# Patient Record
Sex: FEMALE | Race: WHITE | NOT HISPANIC OR LATINO | Employment: UNEMPLOYED | URBAN - METROPOLITAN AREA
[De-identification: names, ages, dates, MRNs, and addresses within clinical notes are randomized per-mention and may not be internally consistent; named-entity substitution may affect disease eponyms.]

---
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
## 2019-01-22 ENCOUNTER — OFFICE VISIT (OUTPATIENT)
Dept: FAMILY MEDICINE | Facility: CLINIC | Age: 19
End: 2019-01-22
Payer: COMMERCIAL

## 2019-01-22 VITALS
OXYGEN SATURATION: 98 % | SYSTOLIC BLOOD PRESSURE: 94 MMHG | TEMPERATURE: 98.2 F | HEIGHT: 70 IN | DIASTOLIC BLOOD PRESSURE: 58 MMHG | WEIGHT: 176.8 LBS | BODY MASS INDEX: 25.31 KG/M2 | HEART RATE: 81 BPM

## 2019-01-22 DIAGNOSIS — R09.81 NASAL CONGESTION: ICD-10-CM

## 2019-01-22 DIAGNOSIS — M67.431 GANGLION CYST OF WRIST, RIGHT: ICD-10-CM

## 2019-01-22 DIAGNOSIS — J45.30 MILD PERSISTENT ASTHMA WITHOUT COMPLICATION: Primary | ICD-10-CM

## 2019-01-22 DIAGNOSIS — F98.8 ATTENTION DEFICIT DISORDER (ADD) WITHOUT HYPERACTIVITY: ICD-10-CM

## 2019-01-22 PROCEDURE — 99214 OFFICE O/P EST MOD 30 MIN: CPT | Performed by: FAMILY MEDICINE

## 2019-01-22 RX ORDER — FLUTICASONE PROPIONATE 50 MCG
1 SPRAY, SUSPENSION (ML) NASAL DAILY
Qty: 1 BOTTLE | Refills: 1 | Status: SHIPPED | OUTPATIENT
Start: 2019-01-22 | End: 2020-01-22

## 2019-01-22 RX ORDER — ALBUTEROL SULFATE 90 UG/1
2 AEROSOL, METERED RESPIRATORY (INHALATION) EVERY 4 HOURS PRN
Qty: 1 INHALER | Refills: 3 | Status: SHIPPED | OUTPATIENT
Start: 2019-01-22 | End: 2019-08-26

## 2019-01-22 ASSESSMENT — MIFFLIN-ST. JEOR: SCORE: 1661.18

## 2019-01-22 NOTE — LETTER
My Asthma Action Plan  Name: Lisa Jacques   YOB: 2000  Date: 1/22/2019   My doctor: Nickie Wylie MD   My clinic: [facility]        My Control Medicine: None  My Rescue Medicine: Albuterol (Proair/Ventolin/Proventil) inhaler 2 puffs every 4-6 hours as needed before exercise   My Asthma Severity: intermittent  Avoid your asthma triggers: exercise or sports  exercise or sports            GREEN ZONE   Good Control    I feel good    No cough or wheeze    Can work, sleep and play without asthma symptoms       Take your asthma control medicine every day.     1. If exercise triggers your asthma, take your rescue medication    15 minutes before exercise or sports, and    During exercise if you have asthma symptoms  2. Spacer to use with inhaler: If you have a spacer, make sure to use it with your inhaler             YELLOW ZONE Getting Worse  I have ANY of these:    I do not feel good    Cough or wheeze    Chest feels tight    Wake up at night   1. Keep taking your Green Zone medications  2. Start taking your rescue medicine:    every 20 minutes for up to 1 hour. Then every 4 hours for 24-48 hours.  3. If you stay in the Yellow Zone for more than 12-24 hours, contact your doctor.  4. If you do not return to the Green Zone in 12-24 hours or you get worse, start taking your oral steroid medicine if prescribed by your provider.           RED ZONE Medical Alert - Get Help  I have ANY of these:    I feel awful    Medicine is not helping    Breathing getting harder    Trouble walking or talking    Nose opens wide to breathe       1. Take your rescue medicine NOW  2. If your provider has prescribed an oral steroid medicine, start taking it NOW  3. Call your doctor NOW  4. If you are still in the Red Zone after 20 minutes and you have not reached your doctor:    Take your rescue medicine again and    Call 911 or go to the emergency room right away    See your regular doctor within 2  weeks of an Emergency Room or Urgent Care visit for follow-up treatment.          Annual Reminders:  Meet with Asthma Educator,  Flu Shot in the Fall, consider Pneumonia Vaccination for patients with asthma (aged 19 and older).    Pharmacy:    [id number] - [place], [place] - [address] [place] [address] PHARMACY [place], [place] - [address]  [facility] - - [place] - [address] [facility], [place] - [address] [id number] - [place], [place] - [address] AT [address] OF [address] [place] [id number] [address]                      Asthma Triggers  How To Control Things That Make Your Asthma Worse    Triggers are things that make your asthma worse.  Look at the list below to help you find your triggers and what you can do about them.  You can help prevent asthma flare-ups by staying away from your triggers.      Trigger                                                          What you can do   Cigarette Smoke  Tobacco smoke can make asthma worse. Do not allow smoking in your home, car or around you.  Be sure no one smokes at a child s day care or school.  If you smoke, ask your health care provider for ways to help you quit.  Ask family members to quit too.  Ask your health care provider for a referral to Quit Plan to help you quit smoking, or call 4-995-599-PLAN.     Colds, Flu, Bronchitis  These are common triggers of asthma. Wash your hands often.  Don t touch your eyes, nose or mouth.  Get a flu shot every year.     Dust Mites  These are tiny bugs that live in cloth or carpet. They are too small to see. Wash sheets and blankets in hot water every week.   Encase pillows and mattress in dust mite proof covers.  Avoid having carpet if you can. If you have carpet, vacuum weekly.   Use a dust mask and HEPA vacuum.   Pollen and Outdoor Mold  Some people are  allergic to trees, grass, or weed pollen, or molds. Try to keep your windows closed.  Limit time out doors when pollen count is high.   Ask you health care provider about taking medicine during allergy season.     Animal Dander  Some people are allergic to skin flakes, urine or saliva from pets with fur or feathers. Keep pets with fur or feathers out of your home.    If you can t keep the pet outdoors, then keep the pet out of your bedroom.  Keep the bedroom door closed.  Keep pets off cloth furniture and away from stuffed toys.     Mice, Rats, and Cockroaches  Some people are allergic to the waste from these pests.   Cover food and garbage.  Clean up spills and food crumbs.  Store grease in the refrigerator.   Keep food out of the bedroom.   Indoor Mold  This can be a trigger if your home has high moisture. Fix leaking faucets, pipes, or other sources of water.   Clean moldy surfaces.  Dehumidify basement if it is damp and smelly.   Smoke, Strong Odors, and Sprays  These can reduce air quality. Stay away from strong odors and sprays, such as perfume, powder, hair spray, paints, smoke incense, paint, cleaning products, candles and new carpet.   Exercise or Sports  Some people with asthma have this trigger. Be active!  Ask your doctor about taking medicine before sports or exercise to prevent symptoms.    Warm up for 5-10 minutes before and after sports or exercise.     Other Triggers of Asthma  Cold air:  Cover your nose and mouth with a scarf.  Sometimes laughing or crying can be a trigger.  Some medicines and food can trigger asthma.

## 2019-01-22 NOTE — PROGRESS NOTES
SUBJECTIVE:   Lisa Jacques is a 18 year old female who presents to clinic today for the following health issues:      Asthma Follow-Up    Was ACT completed today?    Yes    ACT Total Scores 1/22/2019   ACT TOTAL SCORE -   ASTHMA ER VISITS -   ASTHMA HOSPITALIZATIONS -   ACT TOTAL SCORE (Goal Greater than or Equal to 20) 20   In the past 12 months, how many times did you visit the emergency room for your asthma without being admitted to the hospital? 0   In the past 12 months, how many times were you hospitalized overnight because of your asthma? 0       Recent asthma triggers that patient is dealing with: exercise or sports        Amount of exercise or physical activity: 2-3 days/week for an average of 45-60 minutes    Problems taking medications regularly: No    Medication side effects: none    Diet: regular (no restrictions)      Medication Followup of ADD    Taking Medication as prescribed: NO ran out so stopped taking it and she has been fine with no meds     Not having really any attention issues at all    School going well     Side Effects:  None    Medication Helping Symptoms:  not applicable       Musculoskeletal problem/pain      Duration: several months off and on     Description  Location: right wrist lump that is tender     Intensity:  mild    Accompanying signs and symptoms: none    History  Previous similar problem: no   Previous evaluation:  none    Precipitating or alleviating factors:  Trauma or overuse: no   Aggravating factors include: overuse and pressure on it     Therapies tried and outcome: nothing      Problem list and histories reviewed & adjusted, as indicated.  Additional history: as documented    Labs reviewed in EPIC    Reviewed and updated as needed this visit by clinical staff  Tobacco  Allergies  Meds  Problems  Med Hx  Surg Hx  Fam Hx       Reviewed and updated as needed this visit by Provider  Tobacco  Allergies  Meds  Problems  Med Hx  Surg Hx  Fam Hx      "    ROS:  Constitutional, HEENT, cardiovascular, pulmonary, gi and gu systems are negative, except as otherwise noted.    OBJECTIVE:                                                    BP 94/58 (BP Location: Right arm, Patient Position: Chair, Cuff Size: Adult Regular)   Pulse 81   Temp 98.2  F (36.8  C) (Tympanic)   Ht 1.784 m (5' 10.25\")   Wt 80.2 kg (176 lb 12.8 oz)   SpO2 98%   BMI 25.19 kg/m     Body mass index is 25.19 kg/m .  GENERAL APPEARANCE: healthy, alert and no distress  EYES: Eyes grossly normal to inspection, PERRL and conjunctivae and sclerae normal  HENT: ear canals and TM's normal and nose erythema and edema and mouth without ulcers or lesions  NECK: no adenopathy, no asymmetry, masses, or scars and thyroid normal to palpation  RESP: lungs clear to auscultation - no rales, rhonchi or wheezes  CV: regular rates and rhythm, normal S1 S2, no S3 or S4 and no murmur, click or rub  ORTHO: Wrist Exam: WRIST:  Inspection: mass/prominence noted: location: right ulnar flexure side   Palpation: Tender: Mass palpated: location: right ulnar flex.  Characteristics: firm and mobile   Non-tender:   Range of Motion: normal  Strength: no deficits  Special tests:     ELBOW:  elbow exam not done    PSYCH: mentation appears normal and affect normal/bright         ASSESSMENT/PLAN:                                                    1. Mild persistent asthma without complication  Stable no change in treatment plan.   - albuterol (VENTOLIN HFA) 108 (90 Base) MCG/ACT inhaler; Inhale 2 puffs into the lungs every 4 hours as needed for wheezing FOR COUGH, 2 PUFFS 15 MINUTES BEFORE EXERCISE  Dispense: 1 Inhaler; Refill: 3    2. Nasal congestion  Likely rhinitis related to cat exposure   - fluticasone (FLONASE) 50 MCG/ACT nasal spray; Spray 1 spray into both nostrils daily  Dispense: 1 Bottle; Refill: 1    3. Ganglion cyst of wrist, right    - ORTHO  REFERRAL    4. Attention deficit disorder (ADD) without " hyperactivity  Off meds in remission       Patient Instructions   Albuterol is refilled     Try flonase for nasal symptoms, may wait until you are not exposed to cat to see if this resolves on its own    Cyst on the wrist see francis Wylie MD  [facility]

## 2019-01-22 NOTE — NURSING NOTE
"Chief Complaint   Patient presents with     Asthma     Attention Deficit Disorder       Initial BP 94/58 (BP Location: Right arm, Patient Position: Chair, Cuff Size: Adult Regular)   Pulse 81   Temp 98.2  F (36.8  C) (Tympanic)   Ht 1.784 m (5' 10.25\")   Wt 80.2 kg (176 lb 12.8 oz)   SpO2 98%   BMI 25.19 kg/m   Estimated body mass index is 25.19 kg/m  as calculated from the following:    Height as of this encounter: 1.784 m (5' 10.25\").    Weight as of this encounter: 80.2 kg (176 lb 12.8 oz).    Patient presents to the clinic using No DME    Health Maintenance that is potentially due pending provider review:  NONE    n/a    Is there anyone who you would like to be able to receive your results? No  If yes have patient fill out MIKEY    "

## 2019-01-23 ASSESSMENT — ASTHMA QUESTIONNAIRES: ACT_TOTALSCORE: 20

## 2019-08-26 ENCOUNTER — OFFICE VISIT (OUTPATIENT)
Dept: FAMILY MEDICINE | Facility: CLINIC | Age: 19
End: 2019-08-26
Payer: COMMERCIAL

## 2019-08-26 VITALS
OXYGEN SATURATION: 99 % | SYSTOLIC BLOOD PRESSURE: 102 MMHG | HEART RATE: 70 BPM | RESPIRATION RATE: 15 BRPM | BODY MASS INDEX: 26.88 KG/M2 | TEMPERATURE: 98.1 F | DIASTOLIC BLOOD PRESSURE: 70 MMHG | WEIGHT: 187.8 LBS | HEIGHT: 70 IN

## 2019-08-26 DIAGNOSIS — J45.30 MILD PERSISTENT ASTHMA WITHOUT COMPLICATION: ICD-10-CM

## 2019-08-26 DIAGNOSIS — M25.531 RIGHT WRIST PAIN: Primary | ICD-10-CM

## 2019-08-26 PROCEDURE — 99214 OFFICE O/P EST MOD 30 MIN: CPT | Performed by: FAMILY MEDICINE

## 2019-08-26 RX ORDER — ALBUTEROL SULFATE 90 UG/1
2 AEROSOL, METERED RESPIRATORY (INHALATION) EVERY 4 HOURS PRN
Qty: 1 INHALER | Refills: 3 | Status: SHIPPED | OUTPATIENT
Start: 2019-08-26 | End: 2020-12-18

## 2019-08-26 ASSESSMENT — MIFFLIN-ST. JEOR: SCORE: 1706.08

## 2019-08-26 NOTE — PROGRESS NOTES
"Subjective     Lisa Jacques is a 19 year old female who presents to clinic today for the following health issues:    HPI   Cyst      Duration: several years, worse lately    Description (location/character/radiation): right wrist    Intensity:  mild    Accompanying signs and symptoms: none    History (similar episodes/previous evaluation): Seen in January for this    Precipitating or alleviating factors: pressure makes it worse, writing    Therapies tried and outcome: None    She has pain with any use of her wrist now   also with typing and writing or any pressure on the wrist she has pain        Asthma Follow-Up    Was ACT completed today?  24  Yes    ACT Total Scores 1/22/2019   ACT TOTAL SCORE -   ASTHMA ER VISITS -   ASTHMA HOSPITALIZATIONS -   ACT TOTAL SCORE (Goal Greater than or Equal to 20) 20   In the past 12 months, how many times did you visit the emergency room for your asthma without being admitted to the hospital? 0   In the past 12 months, how many times were you hospitalized overnight because of your asthma? 0       How many days per week do you miss taking your asthma controller medication?  0    Please describe any recent triggers for your asthma: None    Have you had any Emergency Room Visits, Urgent Care Visits, or Hospital Admissions since your last office visit?  No              Reviewed and updated as needed this visit by Provider  Tobacco  Allergies  Meds  Problems  Med Hx  Surg Hx  Fam Hx         Review of Systems   ROS COMP: Constitutional, HEENT, cardiovascular, pulmonary, gi and gu systems are negative, except as otherwise noted.      Objective    /70 (BP Location: Right arm, Patient Position: Chair, Cuff Size: Adult Large)   Pulse 70   Temp 98.1  F (36.7  C) (Tympanic)   Resp 15   Ht 1.784 m (5' 10.25\")   Wt 85.2 kg (187 lb 12.8 oz)   SpO2 99%   BMI 26.76 kg/m    Body mass index is 26.76 kg/m .  Physical Exam   GENERAL APPEARANCE: healthy, alert and no " "distress  RESP: lungs clear to auscultation - no rales, rhonchi or wheezes  CV: regular rates and rhythm, normal S1 S2, no S3 or S4 and no murmur, click or rub  ORTHO: Wrist Exam: WRIST:  Inspection: mass/prominence noted: location: right wrist ulnar side ganglion*.    Palpation: Tender: Tenderness:, over mass  Non-tender:   Range of Motion: normal  Strength: no deficits  Special tests: negative Tinel's at carpal tunnel.  , negative Phalen's.      ELBOW:  elbow exam not done    PSYCH: mentation appears normal and affect normal/bright    Diagnostic Test Results:  Labs reviewed in Epic        Assessment & Plan     1. Mild persistent asthma without complication  Stable no change in treatment plan.   - albuterol (VENTOLIN HFA) 108 (90 Base) MCG/ACT inhaler; Inhale 2 puffs into the lungs every 4 hours as needed for wheezing FOR COUGH, 2 PUFFS 15 MINUTES BEFORE EXERCISE  Dispense: 1 Inhaler; Refill: 3    2. Right wrist pain  Due to ganglion   - ORTHO  REFERRAL  - order for DME; Equipment being ordered: wrist splint  Dispense: 1 Device; Refill: 0     BMI:   Estimated body mass index is 26.76 kg/m  as calculated from the following:    Height as of this encounter: 1.784 m (5' 10.25\").    Weight as of this encounter: 85.2 kg (187 lb 12.8 oz).           Patient Instructions   Wrist splint for comfort    Set up Ortho appt for your wrist you want to request the surgeon not sports med    Albuterol is refilled       Return in about 1 month (around 9/26/2019), or if symptoms worsen or fail to improve.      Risks, benefits, side effects and rationale for treatment plan fully discussed with the patient and understanding expressed.     Nickie Wylie MD  [facility]      "

## 2019-08-26 NOTE — PATIENT INSTRUCTIONS
Wrist splint for comfort    Set up Ortho appt for your wrist you want to request the surgeon not sports med    Albuterol is refilled

## 2019-08-26 NOTE — NURSING NOTE
"Chief Complaint   Patient presents with     Cyst       Initial /70 (BP Location: Right arm, Patient Position: Chair, Cuff Size: Adult Large)   Pulse 70   Temp 98.1  F (36.7  C) (Tympanic)   Resp 15   Ht 1.784 m (5' 10.25\")   Wt 85.2 kg (187 lb 12.8 oz)   SpO2 99%   BMI 26.76 kg/m   Estimated body mass index is 26.76 kg/m  as calculated from the following:    Height as of this encounter: 1.784 m (5' 10.25\").    Weight as of this encounter: 85.2 kg (187 lb 12.8 oz).    Patient presents to the clinic using No DME    Health Maintenance that is potentially due pending provider review:  ACT    Possibly completing today per provider review.    Is there anyone who you would like to be able to receive your results? No  If yes have patient fill out MIKEY    "

## 2019-08-27 ASSESSMENT — ASTHMA QUESTIONNAIRES: ACT_TOTALSCORE: 24

## 2020-06-18 ENCOUNTER — MYC MEDICAL ADVICE (OUTPATIENT)
Dept: FAMILY MEDICINE | Facility: CLINIC | Age: 20
End: 2020-06-18

## 2020-11-16 ENCOUNTER — HEALTH MAINTENANCE LETTER (OUTPATIENT)
Age: 20
End: 2020-11-16

## 2020-12-18 ENCOUNTER — OFFICE VISIT (OUTPATIENT)
Dept: FAMILY MEDICINE | Facility: CLINIC | Age: 20
End: 2020-12-18
Payer: COMMERCIAL

## 2020-12-18 VITALS
SYSTOLIC BLOOD PRESSURE: 104 MMHG | BODY MASS INDEX: 26.05 KG/M2 | TEMPERATURE: 98.2 F | DIASTOLIC BLOOD PRESSURE: 74 MMHG | HEIGHT: 70 IN | HEART RATE: 76 BPM | WEIGHT: 182 LBS | RESPIRATION RATE: 12 BRPM

## 2020-12-18 DIAGNOSIS — J45.30 MILD PERSISTENT ASTHMA WITHOUT COMPLICATION: ICD-10-CM

## 2020-12-18 DIAGNOSIS — N92.0 MENORRHAGIA WITH REGULAR CYCLE: ICD-10-CM

## 2020-12-18 DIAGNOSIS — Z00.01 ENCOUNTER FOR WELL ADULT EXAM WITH ABNORMAL FINDINGS: Primary | ICD-10-CM

## 2020-12-18 DIAGNOSIS — Z12.4 SCREENING FOR MALIGNANT NEOPLASM OF CERVIX: ICD-10-CM

## 2020-12-18 LAB — TSH SERPL DL<=0.005 MIU/L-ACNC: 3.82 MU/L (ref 0.4–4)

## 2020-12-18 PROCEDURE — 99213 OFFICE O/P EST LOW 20 MIN: CPT | Mod: 25 | Performed by: FAMILY MEDICINE

## 2020-12-18 PROCEDURE — 36415 COLL VENOUS BLD VENIPUNCTURE: CPT | Performed by: FAMILY MEDICINE

## 2020-12-18 PROCEDURE — 99395 PREV VISIT EST AGE 18-39: CPT | Performed by: FAMILY MEDICINE

## 2020-12-18 PROCEDURE — 86580 TB INTRADERMAL TEST: CPT | Performed by: FAMILY MEDICINE

## 2020-12-18 PROCEDURE — G0145 SCR C/V CYTO,THINLAYER,RESCR: HCPCS | Performed by: FAMILY MEDICINE

## 2020-12-18 PROCEDURE — 84443 ASSAY THYROID STIM HORMONE: CPT | Performed by: FAMILY MEDICINE

## 2020-12-18 RX ORDER — ALBUTEROL SULFATE 90 UG/1
2 AEROSOL, METERED RESPIRATORY (INHALATION) EVERY 4 HOURS PRN
Qty: 1 INHALER | Refills: 3 | Status: SHIPPED | OUTPATIENT
Start: 2020-12-18 | End: 2022-06-08

## 2020-12-18 RX ORDER — ETHYNODIOL DIACETATE AND ETHINYL ESTRADIOL 1 MG-35MCG
1 KIT ORAL DAILY
Qty: 90 TABLET | Refills: 3 | Status: SHIPPED | OUTPATIENT
Start: 2020-12-18 | End: 2021-11-24

## 2020-12-18 ASSESSMENT — ENCOUNTER SYMPTOMS
HEMATOCHEZIA: 0
HEADACHES: 0
FREQUENCY: 0
JOINT SWELLING: 0
MYALGIAS: 0
WEAKNESS: 0
DIZZINESS: 0
COUGH: 0
SORE THROAT: 0
HEARTBURN: 0
CHILLS: 0
DIARRHEA: 0
PARESTHESIAS: 0
BREAST MASS: 0
DYSURIA: 0
FEVER: 0
PALPITATIONS: 0
ARTHRALGIAS: 0
NAUSEA: 0
EYE PAIN: 0
SHORTNESS OF BREATH: 0
NERVOUS/ANXIOUS: 1
HEMATURIA: 0
CONSTIPATION: 0
ABDOMINAL PAIN: 0

## 2020-12-18 ASSESSMENT — PAIN SCALES - GENERAL: PAINLEVEL: NO PAIN (0)

## 2020-12-18 ASSESSMENT — MIFFLIN-ST. JEOR: SCORE: 1670.8

## 2020-12-18 NOTE — PROGRESS NOTES
SUBJECTIVE:   CC: Lisa Jacques is an 20 year old woman who presents for preventive health visit.       Patient has been advised of split billing requirements and indicates understanding: Yes  Healthy Habits:     Getting at least 3 servings of Calcium per day:  NO    Bi-annual eye exam:  Yes    Dental care twice a year:  Yes    Sleep apnea or symptoms of sleep apnea:  None    Diet:  Regular (no restrictions)    Frequency of exercise:  2-3 days/week    Duration of exercise:  45-60 minutes    Taking medications regularly:  Yes    PHQ-2 Total Score: 2    Additional concerns today:  Yes    Menometrorrhagia    Feels like her periods are not good on this pill   Has some bleeding intermentrual   Moodiness 3 of 4 weeks   Not sure if depressed feels tired but is stressed with school   She is not sure if there is something more to it  Not sure if ;modd issues hare PMS or true anxiety   Mom has thyroid issues so would like to rule that out     Today's PHQ-2 Score:   PHQ-2 ( 2000 Pf[employer]) 12/18/2020   Q1: Little interest or pleasure in doing things 1   Q2: Feeling down, depressed or hopeless 1   PHQ-2 Score 2   Q1: Little interest or pleasure in doing things Several days   Q2: Feeling down, depressed or hopeless Several days   PHQ-2 Score 2       Abuse: Current or Past (Physical, Sexual or Emotional) - No  Do you feel safe in your environment? Yes        Social History     Tobacco Use     Smoking status: Never Smoker     Smokeless tobacco: Never Used   Substance Use Topics     Alcohol use: No     If you drink alcohol do you typically have >3 drinks per day or >7 drinks per week? No    Alcohol Use 12/18/2020   Prescreen: >3 drinks/day or >7 drinks/week? No   Prescreen: >3 drinks/day or >7 drinks/week? -       Reviewed orders with patient.  Reviewed health maintenance and updated orders accordingly - Yes  Labs reviewed in EPIC    Mammogram not appropriate for this patient based on age.    Pertinent mammograms are reviewed  "under the imaging tab.  History of abnormal Pap smear: NO - age 21-29 PAP every 3 years recommended     Reviewed and updated as needed this visit by clinical staff  Tobacco  Allergies  Meds  Problems  Med Hx  Surg Hx  Fam Hx          Reviewed and updated as needed this visit by Provider  Tobacco  Allergies  Meds  Problems  Med Hx  Surg Hx  Fam Hx             Review of Systems   Constitutional: Negative for chills and fever.   HENT: Negative for congestion, ear pain, hearing loss and sore throat.    Eyes: Negative for pain and visual disturbance.   Respiratory: Negative for cough and shortness of breath.    Cardiovascular: Negative for chest pain, palpitations and peripheral edema.   Gastrointestinal: Negative for abdominal pain, constipation, diarrhea, heartburn, hematochezia and nausea.   Breasts:  Negative for tenderness, breast mass and discharge.   Genitourinary: Negative for dysuria, frequency, genital sores, hematuria, pelvic pain, urgency, vaginal bleeding and vaginal discharge.   Musculoskeletal: Negative for arthralgias, joint swelling and myalgias.   Skin: Negative for rash.   Neurological: Negative for dizziness, weakness, headaches and paresthesias.   Psychiatric/Behavioral: Negative for mood changes. The patient is nervous/anxious.      See hpi     OBJECTIVE:   /74   Pulse 76   Temp 98.2  F (36.8  C) (Tympanic)   Resp 12   Ht 1.778 m (5' 10\")   Wt 82.6 kg (182 lb)   LMP 11/30/2020   BMI 26.11 kg/m    Physical Exam  GENERAL: healthy, alert and no distress  EYES: Eyes grossly normal to inspection, PERRL and conjunctivae and sclerae normal  HENT: ear canals and TM's normal, nose and mouth without ulcers or lesions  NECK: no adenopathy, no asymmetry, masses, or scars and thyroid normal to palpation  RESP: lungs clear to auscultation - no rales, rhonchi or wheezes  BREAST: normal without masses, tenderness or nipple discharge and no palpable axillary masses or adenopathy  CV: " "regular rate and rhythm, normal S1 S2, no S3 or S4, no murmur, click or rub, no peripheral edema and peripheral pulses strong  ABDOMEN: soft, nontender, no hepatosplenomegaly, no masses and bowel sounds normal   (female): normal female external genitalia, normal urethral meatus, vaginal mucosa pink, moist, well rugated, and normal cervix/adnexa/uterus without masses or discharge  MS: no gross musculoskeletal defects noted, no edema  SKIN: no suspicious lesions or rashes  NEURO: Normal strength and tone, mentation intact and speech normal  PSYCH: mentation appears normal, affect normal/bright    Diagnostic Test Results:  Labs reviewed in Epic    ASSESSMENT/PLAN:   1. Encounter for well adult exam with abnormal findings      2. Screening for malignant neoplasm of cervix    - Pap imaged thin layer screen only - recommended age 21 - 24 years    3. Menorrhagia with regular cycle  With increase in moodiness and fatigue   Will try new OCP and see if this is better would consider med for anxiety or depression if sxs not better with change of OCP   Seems currenly cyclical   - ethynodiol-ethinyl estradiol (KELNOR) 1-35 MG-MCG tablet; Take 1 tablet by mouth daily  Dispense: 90 tablet; Refill: 3  - TSH with free T4 reflex    4. Asthma   Stable no change in treatment plan.     Patient has been advised of split billing requirements and indicates understanding: Yes  COUNSELING:  Reviewed preventive health counseling, as reflected in patient instructions    Estimated body mass index is 26.11 kg/m  as calculated from the following:    Height as of this encounter: 1.778 m (5' 10\").    Weight as of this encounter: 82.6 kg (182 lb).        She reports that she has never smoked. She has never used smokeless tobacco.      Counseling Resources:  ATP IV Guidelines  Pooled Cohorts Equation Calculator  Breast Cancer Risk Calculator  BRCA-Related Cancer Risk Assessment: FHS-7 Tool  FRAX Risk Assessment  ICSI Preventive Guidelines  Dietary " Guidelines for Americans, 2010  USDA's MyPlate  ASA Prophylaxis  Lung CA Screening    Nickie Wylie MD  [facility]

## 2020-12-18 NOTE — NURSING NOTE
"Chief Complaint   Patient presents with     Physical     with pap     /74   Pulse 76   Temp 98.2  F (36.8  C) (Tympanic)   Resp 12   Ht 1.778 m (5' 10\")   Wt 82.6 kg (182 lb)   LMP 11/30/2020   BMI 26.11 kg/m   Estimated body mass index is 26.11 kg/m  as calculated from the following:    Height as of this encounter: 1.778 m (5' 10\").    Weight as of this encounter: 82.6 kg (182 lb).  Patient presents to the clinic using No DME      Health Maintenance that is potentially due pending provider review:    Health Maintenance Due   Topic Date Due     Pneumococcal Vaccine: Pediatrics (0 to 5 Years) and At-Risk Patients (6 to 64 Years) (1 of 1 - PPSV23) 07/06/2005     HPV IMMUNIZATION (1 - 2-dose series) 07/06/2010     HIV SCREENING  07/06/2014     PREVENTIVE CARE VISIT  10/22/2014     HEPATITIS C SCREENING  07/06/2017     ASTHMA ACTION PLAN  01/22/2020     ASTHMA CONTROL TEST  02/26/2020     PAP  07/06/2020     INFLUENZA VACCINE (1) 09/01/2020        Will wait for flu shot.        "

## 2020-12-19 ASSESSMENT — ASTHMA QUESTIONNAIRES: ACT_TOTALSCORE: 23

## 2020-12-22 LAB
COPATH REPORT: NORMAL
PAP: NORMAL

## 2020-12-28 ENCOUNTER — ALLIED HEALTH/NURSE VISIT (OUTPATIENT)
Dept: FAMILY MEDICINE | Facility: CLINIC | Age: 20
End: 2020-12-28
Payer: COMMERCIAL

## 2020-12-28 DIAGNOSIS — Z23 NEED FOR TUBERCULOSIS VACCINATION: Primary | ICD-10-CM

## 2020-12-28 PROCEDURE — 86580 TB INTRADERMAL TEST: CPT

## 2020-12-30 ENCOUNTER — ALLIED HEALTH/NURSE VISIT (OUTPATIENT)
Dept: FAMILY MEDICINE | Facility: CLINIC | Age: 20
End: 2020-12-30
Payer: COMMERCIAL

## 2020-12-30 DIAGNOSIS — Z23 NEED FOR TUBERCULOSIS VACCINATION: Primary | ICD-10-CM

## 2020-12-30 PROCEDURE — 99207 PR NO CHARGE NURSE ONLY: CPT

## 2021-02-11 NOTE — PATIENT INSTRUCTIONS
;      [facility] Emergency Department  [address], [place] [address]  [phone]     Clinical Summary     PERSON INFORMATION   Name LEONELA PRETTY Age  20 Years  2000 12:00 AM   Acct# [id number] Sex Female Phone [phone]   Dispo Type Home - (i.e. Home on oxygen, DME)-  Arrival 2020 12:05 PM Checkout 2020 4:02 PM    Address: [address] [place] [id number]      Visit Reason R SIDED FACIAL  R ARM PAIN     ED Physician Note      ED Time Seen By Provider Entered On:  2020 12:17     Performed On:  2020 12:17  by Josefa Mackenzie               Time Seen By Provider   Time Seen by Provider :   2020 12:17    Josefa Mackenzie - 2020 12:17           VITALS INFORMATION  Vitals/Ht/Wt  Temperature Tympanic:  97 F  Peripheral Pulse Rate:  122 bpm  Respiratory Rate:  20 br/min  Oxygen Saturation:  98 %  Oxygen Therapy:  Room air  Systolic Blood Pressure:  131 mmHg  Diastolic Blood Pressure:  87 mmHg  Mean Arterial Pressure:  102 mmHg  Height:  165.1 cm  Weight:  77.35 kg  Weight Type:  Measured  Weight Method:  Bed scale  ED Hand-Off Communication  ED Hand-Off Reason:  Shift Report  ED Hand-Off Reason / In Hospital:  SBAR reviewed during report  Patient on Cardiac Monitor:  No  Sitter Present:  No  Changes in Condition/Tx/Progress Note:  Pt awaiting Udrug results.  Potential Core Measure:  N/A  Hand-off Report Given to:  Darlene Love   Allergy Info:          No Known Medication Allergies             Prescriptions:            DISCHARGE INFORMATION   Discharge Disposition: Home - (i.e. Home on oxygen, DME)-      PATIENT EDUCATION INFORMATION   Instructions:       Head Injury, Adult   Follow up:                  With: Address: When:   [address]  [id number] [phone] Business (1) Within 1 to 2 days   Comments:   Take ibuprofen or tylenol for  Change birth control and se if this helps your mood     TB test today     We will also check your thyroid    Update me in one month with how the new pill is working and how your mood is   pain.  Take medications as prescribed.  Minimize exposure to bright lights or screens for at least 1 week.   No contact sports for two weeks.  Follow up with primary care in 1-2 days for close monitoring  Ice the affected area for 15 minutes at a time taking at least 30 minute breaks in between.  Return to ED if any new or worsening symptoms including lightheadedness or passing out, vision changes, vomiting, trouble with speech, trouble walking, or memory loss.            DIAGNOSIS

## 2021-05-21 ENCOUNTER — OFFICE VISIT (OUTPATIENT)
Dept: FAMILY MEDICINE | Facility: CLINIC | Age: 21
End: 2021-05-21
Payer: COMMERCIAL

## 2021-05-21 VITALS
BODY MASS INDEX: 26.34 KG/M2 | WEIGHT: 184 LBS | DIASTOLIC BLOOD PRESSURE: 74 MMHG | SYSTOLIC BLOOD PRESSURE: 110 MMHG | RESPIRATION RATE: 12 BRPM | HEART RATE: 84 BPM | HEIGHT: 70 IN

## 2021-05-21 DIAGNOSIS — H02.826 MILIA OF EYELIDS OF BOTH EYES: Primary | ICD-10-CM

## 2021-05-21 DIAGNOSIS — H02.823 MILIA OF EYELIDS OF BOTH EYES: Primary | ICD-10-CM

## 2021-05-21 DIAGNOSIS — N89.8 VAGINAL LESION: ICD-10-CM

## 2021-05-21 PROCEDURE — 99213 OFFICE O/P EST LOW 20 MIN: CPT | Performed by: FAMILY MEDICINE

## 2021-05-21 PROCEDURE — 87491 CHLMYD TRACH DNA AMP PROBE: CPT | Performed by: FAMILY MEDICINE

## 2021-05-21 SDOH — HEALTH STABILITY: MENTAL HEALTH: HOW OFTEN DO YOU HAVE A DRINK CONTAINING ALCOHOL?: MONTHLY OR LESS

## 2021-05-21 SDOH — HEALTH STABILITY: MENTAL HEALTH: HOW OFTEN DO YOU HAVE 6 OR MORE DRINKS ON ONE OCCASION?: NOT ASKED

## 2021-05-21 SDOH — HEALTH STABILITY: MENTAL HEALTH: HOW MANY STANDARD DRINKS CONTAINING ALCOHOL DO YOU HAVE ON A TYPICAL DAY?: NOT ASKED

## 2021-05-21 ASSESSMENT — MIFFLIN-ST. JEOR: SCORE: 1683.84

## 2021-05-21 NOTE — PROGRESS NOTES
"    Assessment & Plan     Milia of eyelids of both eyes  Reassurance   If removal wanted would sent to derm     Vaginal lesion  Reassurance normal vaginal tissue   - Chlamydia trachomatis PCR             BMI:   Estimated body mass index is 26.21 kg/m  as calculated from the following:    Height as of this encounter: 1.784 m (5' 10.25\").    Weight as of this encounter: 83.5 kg (184 lb).           Return in about 1 year (around 5/21/2022) for Routine Preventative Visit.    Nickie Wylie MD  [facility]    Ruben Cadena is a 20 year old who presents for the following health issues     HPI     Skin tags on eyes - would like checked on     Vaginal Symptoms  Onset/Duration: 1 week  Some mild cramping after period  Thinks she may have some bumps by her vagina          Review of Systems   Constitutional, HEENT, cardiovascular, pulmonary, gi and gu systems are negative, except as otherwise noted.      Objective    /74   Pulse 84   Resp 12   Ht 1.784 m (5' 10.25\")   Wt 83.5 kg (184 lb)   LMP 05/10/2021   BMI 26.21 kg/m    Body mass index is 26.21 kg/m .  Physical Exam   GENERAL APPEARANCE: healthy, alert and no distress  EYES: Eyes grossly normal to inspection and milia joan eye lids    (female): normal cervix, adnexae, and uterus without masses or discharge  PSYCH: mentation appears normal and affect normal/bright                  "

## 2021-05-21 NOTE — NURSING NOTE
"Chief Complaint   Patient presents with     Derm Problem     skin tags on eyes     Vaginal Problem     /74   Pulse 84   Resp 12   Ht 1.784 m (5' 10.25\")   Wt 83.5 kg (184 lb)   LMP 05/10/2021   BMI 26.21 kg/m   Estimated body mass index is 26.21 kg/m  as calculated from the following:    Height as of this encounter: 1.784 m (5' 10.25\").    Weight as of this encounter: 83.5 kg (184 lb).  Patient presents to the clinic using No [facility] DME      Health Maintenance that is potentially due pending provider review:    Health Maintenance Due   Topic Date Due     ANNUAL REVIEW OF HM ORDERS  Never done     ADVANCE CARE PLANNING  Never done     Pneumococcal Vaccine: Pediatrics (0 to 5 Years) and At-Risk Patients (6 to 64 Years) (1 of 2 - PPSV23) Never done     HPV IMMUNIZATION (1 - 2-dose series) Never done     COVID-19 Vaccine (1) Never done     HIV SCREENING  Never done     HEPATITIS C SCREENING  Never done     CHLAMYDIA SCREENING  06/05/2019     ASTHMA ACTION PLAN  01/22/2020     ASTHMA CONTROL TEST  06/18/2021        Pended.        "

## 2021-05-22 LAB
C TRACH DNA SPEC QL NAA+PROBE: NEGATIVE
SPECIMEN SOURCE: NORMAL

## 2021-05-22 ASSESSMENT — ASTHMA QUESTIONNAIRES: ACT_TOTALSCORE: 24

## 2021-09-18 ENCOUNTER — HEALTH MAINTENANCE LETTER (OUTPATIENT)
Age: 21
End: 2021-09-18

## 2021-11-04 ENCOUNTER — E-VISIT (OUTPATIENT)
Dept: FAMILY MEDICINE | Facility: CLINIC | Age: 21
End: 2021-11-04
Payer: COMMERCIAL

## 2021-11-04 DIAGNOSIS — H10.32 ACUTE BACTERIAL CONJUNCTIVITIS OF LEFT EYE: Primary | ICD-10-CM

## 2021-11-04 PROCEDURE — 99421 OL DIG E/M SVC 5-10 MIN: CPT | Performed by: FAMILY MEDICINE

## 2021-11-04 RX ORDER — POLYMYXIN B SULFATE AND TRIMETHOPRIM 1; 10000 MG/ML; [USP'U]/ML
1-2 SOLUTION OPHTHALMIC EVERY 4 HOURS
Qty: 10 ML | Refills: 0 | Status: SHIPPED | OUTPATIENT
Start: 2021-11-04 | End: 2021-11-11

## 2021-11-04 NOTE — PATIENT INSTRUCTIONS
Thank you for choosing us for your care. I have placed an order for a prescription so that you can start treatment. View your full visit summary for details by clicking on the link below. Your pharmacist will able to address any questions you may have about the medication.     If you re not feeling better within 2-3 days, please schedule an appointment.  You can schedule an appointment right here in [facility], or call [phone]  If the visit is for the same symptoms as your eVisit, we ll refund the cost of your eVisit if seen within seven days.      Bacterial Conjunctivitis    You have an infection in the membranes covering the white part of the eye. This part of the eye is called the conjunctiva. The infection is called conjunctivitis. The most common symptoms of conjunctivitis include a thick, pus-like discharge from the eye, swollen eyelids, redness, eyelids sticking together upon awakening, and a gritty or scratchy feeling in the eye. Your infection was caused by bacteria. It may be treated with medicine. With treatment, the infection takes about 7 to 10 days to resolve.   Home care    Use prescribed antibiotic eye drops or ointment as directed to treat the infection.    Apply a warm compress (towel soaked in warm water) to the affected eye 3 to 4 times a day. Do this just before applying medicine to the eye.    Use a warm, wet cloth to wipe away crusting of the eyelids in the morning. This is caused by mucus drainage during the night. You may also use saline irrigating solution or artificial tears to rinse away mucus in the eye. Do not put a patch over the eye.    Wash your hands before and after touching the infected eye. This is to prevent spreading the infection to the other eye, and to other people. Don't share your towels or washcloths with others.    You may use acetaminophen or ibuprofen to control pain, unless another medicine was prescribed. Talk with your healthcare provider before using these  medicines if you have chronic liver or kidney disease. Also talk with your provider if you have ever had a stomach ulcer or digestive bleeding.    Don't wear contact lenses until your eyes have healed and all symptoms are gone.    Follow-up care  Follow up with your healthcare provider, or as advised.  When to seek medical advice  Call your healthcare provider right away if any of these occur:    Worsening vision    Increasing pain in the eye    Increasing swelling or redness of the eyelid    Redness spreading around the eye  [employer] last reviewed this educational content on 4/1/2020 2000-2021 [employer]. All rights reserved. This information is not intended as a substitute for professional medical care. Always follow your healthcare professional's instructions.

## 2021-11-24 ENCOUNTER — MYC MEDICAL ADVICE (OUTPATIENT)
Dept: FAMILY MEDICINE | Facility: CLINIC | Age: 21
End: 2021-11-24
Payer: COMMERCIAL

## 2021-11-24 DIAGNOSIS — N92.0 MENORRHAGIA WITH REGULAR CYCLE: ICD-10-CM

## 2021-11-24 RX ORDER — ETHYNODIOL DIACETATE AND ETHINYL ESTRADIOL 1 MG-35MCG
1 KIT ORAL DAILY
Qty: 84 TABLET | Refills: 1 | Status: SHIPPED | OUTPATIENT
Start: 2021-11-24 | End: 2024-02-21

## 2021-12-14 ENCOUNTER — E-VISIT (OUTPATIENT)
Dept: FAMILY MEDICINE | Facility: CLINIC | Age: 21
End: 2021-12-14
Payer: COMMERCIAL

## 2021-12-14 DIAGNOSIS — J06.9 UPPER RESPIRATORY TRACT INFECTION, UNSPECIFIED TYPE: Primary | ICD-10-CM

## 2021-12-14 PROCEDURE — 99422 OL DIG E/M SVC 11-20 MIN: CPT | Performed by: FAMILY MEDICINE

## 2021-12-14 RX ORDER — BENZONATATE 100 MG/1
100 CAPSULE ORAL 3 TIMES DAILY PRN
Qty: 30 CAPSULE | Refills: 0 | Status: SHIPPED | OUTPATIENT
Start: 2021-12-14 | End: 2024-02-21

## 2021-12-14 NOTE — PATIENT INSTRUCTIONS
"  Dear Lisa Jacques    After reviewing your responses, I've been able to diagnose you with \"Bronchitis\" which is a common infection of your lungs that is nearly always caused by a virus. The virus causes swelling and irritation of the air passages of your lungs which leads to cough. The illness spreads from your nose and throat to your windpipe and airways. It is often called a \"chest cold\" and can last up to 2 weeks, but is not a serious illness. Exposure to cigarette smoke usually makes this significantly worse.      To treat bronchitis, the main thing to do is drink lots of fluids and rest. Cough medications over-the-counter such as mucinex, robitussin or \"cold and sinus\" medications can be helpful. Ibuprofen and Tylenol also help with fevers or aching feelings that you often have with this kind of illness. Do not take ibuprofen if you have kidney disease, stomach ulcers or allergy to aspirin.     Bronchitis is most often highly contagious as viruses are spread through the air or touch. Avoid contact with others who may become infected, particularly children, the elderly and those whose immune systems might be weak.     If your symptoms worsen, you develop chest pain or shortness of breath, fevers over 101, or are not improving in 5 days, please contact your primary care provider for an appointment or visit any of our convenient Walk-in Care or Urgent Care Centers to be seen which can be found on our website here.    Thanks again for choosing us as your health care partner,    Nickie Wylie MD  "

## 2022-02-16 ENCOUNTER — MYC MEDICAL ADVICE (OUTPATIENT)
Dept: FAMILY MEDICINE | Facility: CLINIC | Age: 22
End: 2022-02-16
Payer: COMMERCIAL

## 2022-03-05 ENCOUNTER — HEALTH MAINTENANCE LETTER (OUTPATIENT)
Age: 22
End: 2022-03-05

## 2022-04-12 ENCOUNTER — MYC MEDICAL ADVICE (OUTPATIENT)
Dept: FAMILY MEDICINE | Facility: CLINIC | Age: 22
End: 2022-04-12
Payer: COMMERCIAL

## 2022-06-08 ENCOUNTER — MYC MEDICAL ADVICE (OUTPATIENT)
Dept: FAMILY MEDICINE | Facility: CLINIC | Age: 22
End: 2022-06-08
Payer: COMMERCIAL

## 2022-06-08 DIAGNOSIS — J45.30 MILD PERSISTENT ASTHMA WITHOUT COMPLICATION: ICD-10-CM

## 2022-06-08 RX ORDER — ALBUTEROL SULFATE 90 UG/1
2 AEROSOL, METERED RESPIRATORY (INHALATION) EVERY 4 HOURS PRN
Qty: 18 G | Refills: 0 | Status: SHIPPED | OUTPATIENT
Start: 2022-06-08 | End: 2024-02-21

## 2022-11-19 ENCOUNTER — HEALTH MAINTENANCE LETTER (OUTPATIENT)
Age: 22
End: 2022-11-19

## 2023-04-09 ENCOUNTER — HEALTH MAINTENANCE LETTER (OUTPATIENT)
Age: 23
End: 2023-04-09

## 2023-10-06 ENCOUNTER — E-VISIT (OUTPATIENT)
Dept: FAMILY MEDICINE | Facility: CLINIC | Age: 23
End: 2023-10-06
Payer: COMMERCIAL

## 2023-10-06 DIAGNOSIS — R21 RASH: Primary | ICD-10-CM

## 2023-10-06 DIAGNOSIS — L21.9 SEBORRHEIC DERMATITIS: ICD-10-CM

## 2023-10-06 PROCEDURE — 99421 OL DIG E/M SVC 5-10 MIN: CPT | Performed by: FAMILY MEDICINE

## 2023-10-09 RX ORDER — KETOCONAZOLE 20 MG/ML
SHAMPOO TOPICAL
Qty: 120 ML | Refills: 1 | Status: SHIPPED | OUTPATIENT
Start: 2023-10-09

## 2024-02-21 ENCOUNTER — OFFICE VISIT (OUTPATIENT)
Dept: FAMILY MEDICINE | Facility: CLINIC | Age: 24
End: 2024-02-21
Payer: COMMERCIAL

## 2024-02-21 VITALS
WEIGHT: 177 LBS | SYSTOLIC BLOOD PRESSURE: 108 MMHG | BODY MASS INDEX: 25.34 KG/M2 | HEART RATE: 56 BPM | TEMPERATURE: 97.6 F | HEIGHT: 70 IN | OXYGEN SATURATION: 98 % | RESPIRATION RATE: 12 BRPM | DIASTOLIC BLOOD PRESSURE: 70 MMHG

## 2024-02-21 DIAGNOSIS — Z11.3 SCREENING FOR STDS (SEXUALLY TRANSMITTED DISEASES): ICD-10-CM

## 2024-02-21 DIAGNOSIS — Z12.4 CERVICAL CANCER SCREENING: ICD-10-CM

## 2024-02-21 DIAGNOSIS — Z00.00 ROUTINE GENERAL MEDICAL EXAMINATION AT A HEALTH CARE FACILITY: Primary | ICD-10-CM

## 2024-02-21 DIAGNOSIS — Z13.220 SCREENING FOR HYPERLIPIDEMIA: ICD-10-CM

## 2024-02-21 DIAGNOSIS — Z11.4 SCREENING FOR HIV (HUMAN IMMUNODEFICIENCY VIRUS): ICD-10-CM

## 2024-02-21 DIAGNOSIS — B35.4 TINEA CORPORIS: ICD-10-CM

## 2024-02-21 DIAGNOSIS — Z11.59 NEED FOR HEPATITIS C SCREENING TEST: ICD-10-CM

## 2024-02-21 DIAGNOSIS — Z13.1 SCREENING FOR DIABETES MELLITUS: ICD-10-CM

## 2024-02-21 DIAGNOSIS — J45.30 MILD PERSISTENT ASTHMA WITHOUT COMPLICATION: ICD-10-CM

## 2024-02-21 PROBLEM — K35.80 APPENDICITIS, ACUTE: Status: ACTIVE | Noted: 2019-09-15

## 2024-02-21 LAB
C TRACH DNA SPEC QL NAA+PROBE: NEGATIVE
CHOLEST SERPL-MCNC: 96 MG/DL
FASTING STATUS PATIENT QL REPORTED: YES
FASTING STATUS PATIENT QL REPORTED: YES
GLUCOSE SERPL-MCNC: 96 MG/DL (ref 70–99)
HCV AB SERPL QL IA: NONREACTIVE
HDLC SERPL-MCNC: 46 MG/DL
HIV 1+2 AB+HIV1 P24 AG SERPL QL IA: NONREACTIVE
LDLC SERPL CALC-MCNC: 45 MG/DL
NONHDLC SERPL-MCNC: 50 MG/DL
TRIGL SERPL-MCNC: 27 MG/DL

## 2024-02-21 PROCEDURE — 82947 ASSAY GLUCOSE BLOOD QUANT: CPT | Performed by: FAMILY MEDICINE

## 2024-02-21 PROCEDURE — 87491 CHLMYD TRACH DNA AMP PROBE: CPT | Performed by: FAMILY MEDICINE

## 2024-02-21 PROCEDURE — 80061 LIPID PANEL: CPT | Performed by: FAMILY MEDICINE

## 2024-02-21 PROCEDURE — 99214 OFFICE O/P EST MOD 30 MIN: CPT | Mod: 25 | Performed by: FAMILY MEDICINE

## 2024-02-21 PROCEDURE — G0145 SCR C/V CYTO,THINLAYER,RESCR: HCPCS | Performed by: FAMILY MEDICINE

## 2024-02-21 PROCEDURE — 90651 9VHPV VACCINE 2/3 DOSE IM: CPT | Performed by: FAMILY MEDICINE

## 2024-02-21 PROCEDURE — 90472 IMMUNIZATION ADMIN EACH ADD: CPT | Performed by: FAMILY MEDICINE

## 2024-02-21 PROCEDURE — 87389 HIV-1 AG W/HIV-1&-2 AB AG IA: CPT | Performed by: FAMILY MEDICINE

## 2024-02-21 PROCEDURE — 90471 IMMUNIZATION ADMIN: CPT | Performed by: FAMILY MEDICINE

## 2024-02-21 PROCEDURE — 36415 COLL VENOUS BLD VENIPUNCTURE: CPT | Performed by: FAMILY MEDICINE

## 2024-02-21 PROCEDURE — 86803 HEPATITIS C AB TEST: CPT | Performed by: FAMILY MEDICINE

## 2024-02-21 PROCEDURE — 99395 PREV VISIT EST AGE 18-39: CPT | Mod: 25 | Performed by: FAMILY MEDICINE

## 2024-02-21 PROCEDURE — 90715 TDAP VACCINE 7 YRS/> IM: CPT | Performed by: FAMILY MEDICINE

## 2024-02-21 RX ORDER — ALBUTEROL SULFATE 90 UG/1
2 AEROSOL, METERED RESPIRATORY (INHALATION) EVERY 4 HOURS PRN
Qty: 18 G | Refills: 0 | Status: SHIPPED | OUTPATIENT
Start: 2024-02-21

## 2024-02-21 RX ORDER — PRENATAL VIT 91/IRON/FOLIC/DHA 28-975-200
COMBINATION PACKAGE (EA) ORAL 2 TIMES DAILY
Qty: 42 G | Refills: 1 | Status: SHIPPED | OUTPATIENT
Start: 2024-02-21

## 2024-02-21 SDOH — HEALTH STABILITY: PHYSICAL HEALTH: ON AVERAGE, HOW MANY DAYS PER WEEK DO YOU ENGAGE IN MODERATE TO STRENUOUS EXERCISE (LIKE A BRISK WALK)?: 3 DAYS

## 2024-02-21 ASSESSMENT — ASTHMA QUESTIONNAIRES
ACT_TOTALSCORE: 19
QUESTION_2 LAST FOUR WEEKS HOW OFTEN HAVE YOU HAD SHORTNESS OF BREATH: THREE TO SIX TIMES A WEEK
QUESTION_3 LAST FOUR WEEKS HOW OFTEN DID YOUR ASTHMA SYMPTOMS (WHEEZING, COUGHING, SHORTNESS OF BREATH, CHEST TIGHTNESS OR PAIN) WAKE YOU UP AT NIGHT OR EARLIER THAN USUAL IN THE MORNING: NOT AT ALL
QUESTION_1 LAST FOUR WEEKS HOW MUCH OF THE TIME DID YOUR ASTHMA KEEP YOU FROM GETTING AS MUCH DONE AT WORK, SCHOOL OR AT HOME: NONE OF THE TIME
ACT_TOTALSCORE: 21
QUESTION_4 LAST FOUR WEEKS HOW OFTEN HAVE YOU USED YOUR RESCUE INHALER OR NEBULIZER MEDICATION (SUCH AS ALBUTEROL): ONE OR TWO TIMES PER DAY
QUESTION_5 LAST FOUR WEEKS HOW WOULD YOU RATE YOUR ASTHMA CONTROL: WELL CONTROLLED

## 2024-02-21 ASSESSMENT — PAIN SCALES - GENERAL: PAINLEVEL: NO PAIN (0)

## 2024-02-21 ASSESSMENT — SOCIAL DETERMINANTS OF HEALTH (SDOH): HOW OFTEN DO YOU GET TOGETHER WITH FRIENDS OR RELATIVES?: TWICE A WEEK

## 2024-02-21 NOTE — PATIENT INSTRUCTIONS
Preventive Care Advice   This is general advice given by our system to help you stay healthy. However, your care team may have specific advice just for you. Please talk to your care team about your preventive care needs.  Nutrition  Eat 5 or more servings of fruits and vegetables each day.  Try wheat bread, brown rice and whole grain pasta (instead of white bread, rice, and pasta).  Get enough calcium and vitamin D. Check the label on foods and aim for 100% of the RDA (recommended daily allowance).  Lifestyle  Exercise at least 150 minutes each week  (30 minutes a day, 5 days a week).  Do muscle strengthening activities 2 days a week. These help control your weight and prevent disease.  No smoking.  Wear sunscreen to prevent skin cancer.  Have a dental exam and cleaning every 6 months.  Yearly exams  See your health care team every year to talk about:  Any changes in your health.  Any medicines your care team has prescribed.  Preventive care, family planning, and ways to prevent chronic diseases.  Shots (vaccines)   HPV shots (up to age 26), if you've never had them before.  Hepatitis B shots (up to age 59), if you've never had them before.  COVID-19 shot: Get this shot when it's due.  Flu shot: Get a flu shot every year.  Tetanus shot: Get a tetanus shot every 10 years.  Pneumococcal, hepatitis A, and RSV shots: Ask your care team if you need these based on your risk.  Shingles shot (for age 50 and up)  General health tests  Diabetes screening:  Starting at age 35, Get screened for diabetes at least every 3 years.  If you are younger than age 35, ask your care team if you should be screened for diabetes.  Cholesterol test: At age 39, start having a cholesterol test every 5 years, or more often if advised.  Bone density scan (DEXA): At age 50, ask your care team if you should have this scan for osteoporosis (brittle bones).  Hepatitis C: Get tested at least once in your life.  STIs (sexually transmitted  infections)  Before age 24: Ask your care team if you should be screened for STIs.  After age 24: Get screened for STIs if you're at risk. You are at risk for STIs (including HIV) if:  You are sexually active with more than one person.  You don't use condoms every time.  You or a partner was diagnosed with a sexually transmitted infection.  If you are at risk for HIV, ask about PrEP medicine to prevent HIV.  Get tested for HIV at least once in your life, whether you are at risk for HIV or not.  Cancer screening tests  Cervical cancer screening: If you have a cervix, begin getting regular cervical cancer screening tests starting at age 21.  Breast cancer scan (mammogram): If you've ever had breasts, begin having regular mammograms starting at age 40. This is a scan to check for breast cancer.  Colon cancer screening: It is important to start screening for colon cancer at age 45.  Have a colonoscopy test every 10 years (or more often if you're at risk) Or, ask your provider about stool tests like a FIT test every year or Cologuard test every 3 years.  To learn more about your testing options, visit:   https://www.INDOM/569978.pdf.  For help making a decision, visit:   https://bit.ly/wc45145.  Prostate cancer screening test: If you have a prostate, ask your care team if a prostate cancer screening test (PSA) at age 55 is right for you.  Lung cancer screening: If you are a current or former smoker ages 50 to 80, ask your care team if ongoing lung cancer screenings are right for you.  For informational purposes only. Not to replace the advice of your health care provider. Copyright   2023 [facility]. All rights reserved. Clinically reviewed by the [facility] Transitions Program. [employer] 948059 - REV 01/24.    Learning About Stress  What is stress?     Stress is your body's response to a hard situation. Your body can have a physical, emotional, or mental response. Stress is a fact of life for  most people, and it affects everyone differently. What causes stress for you may not be stressful for someone else.  A lot of things can cause stress. You may feel stress when you go on a job interview, take a test, or run a race. This kind of short-term stress is normal and even useful. It can help you if you need to work hard or react quickly. For example, stress can help you finish an important job on time.  Long-term stress is caused by ongoing stressful situations or events. Examples of long-term stress include long-term health problems, ongoing problems at work, or conflicts in your family. Long-term stress can harm your health.  How does stress affect your health?  When you are stressed, your body responds as though you are in danger. It makes hormones that speed up your heart, make you breathe faster, and give you a burst of energy. This is called the fight-or-flight stress response. If the stress is over quickly, your body goes back to normal and no harm is done.  But if stress happens too often or lasts too long, it can have bad effects. Long-term stress can make you more likely to get sick, and it can make symptoms of some diseases worse. If you tense up when you are stressed, you may develop neck, shoulder, or low back pain. Stress is linked to high blood pressure and heart disease.  Stress also harms your emotional health. It can make you jerez, tense, or depressed. Your relationships may suffer, and you may not do well at work or school.  What can you do to manage stress?  You can try these things to help manage stress:   Do something active. Exercise or activity can help reduce stress. Walking is a great way to get started. Even everyday activities such as housecleaning or yard work can help.  Try yoga or genevieve chi. These techniques combine exercise and meditation. You may need some training at first to learn them.  Do something you enjoy. For example, listen to music or go to a movie. Practice your  "hobby or do volunteer work.  Meditate. This can help you relax, because you are not worrying about what happened before or what may happen in the future.  Do guided imagery. Imagine yourself in any setting that helps you feel calm. You can use online videos, books, or a teacher to guide you.  Do breathing exercises. For example:  From a standing position, bend forward from the waist with your knees slightly bent. Let your arms dangle close to the floor.  Breathe in slowly and deeply as you return to a standing position. Roll up slowly and lift your head last.  Hold your breath for just a few seconds in the standing position.  Breathe out slowly and bend forward from the waist.  Let your feelings out. Talk, laugh, cry, and express anger when you need to. Talking with supportive friends or family, a counselor, or a artem leader about your feelings is a healthy way to relieve stress. Avoid discussing your feelings with people who make you feel worse.  Write. It may help to write about things that are bothering you. This helps you find out how much stress you feel and what is causing it. When you know this, you can find better ways to cope.  What can you do to prevent stress?  You might try some of these things to help prevent stress:  Manage your time. This helps you find time to do the things you want and need to do.  Get enough sleep. Your body recovers from the stresses of the day while you are sleeping.  Get support. Your family, friends, and community can make a difference in how you experience stress.  Limit your news feed. Avoid or limit time on social media or news that may make you feel stressed.  Do something active. Exercise or activity can help reduce stress. Walking is a great way to get started.  Where can you learn more?  Go to https://www.healthwise.net/patiented  Enter N032 in the search box to learn more about \"Learning About Stress.\"  Current as of: February 26, 2023               Content Version: " 13.8    9861-6016 [employer].   Care instructions adapted under license by your healthcare professional. If you have questions about a medical condition or this instruction, always ask your healthcare professional. [employer] disclaims any warranty or liability for your use of this information.      Safer Sex: Care Instructions  Overview  Safer sex is a way to reduce your risk of getting a sexually transmitted infection (STI). It can also help prevent pregnancy.  Several products can help you practice safer sex and reduce your chance of STIs. One of the best is a condom. There are internal and external condoms. You can use a special rubber sheet (dental dam) for protection during oral sex. Disposable gloves can keep your hands from touching blood, semen, or other body fluids that can carry infections.  Remember that birth control methods such as diaphragms, IUDs, foams, and birth control pills do not stop you from getting STIs.  Follow-up care is a key part of your treatment and safety. Be sure to make and go to all appointments, and call your doctor if you are having problems. It's also a good idea to know your test results and keep a list of the medicines you take.  How can you care for yourself at home?  Think about getting vaccinated to help prevent hepatitis A, hepatitis B, and human papillomavirus (HPV). They can be spread through sex.  Use a condom every time you have sex. Use an external condom, which goes on the penis. Or use an internal condom, which goes into the vagina or anus.  Make sure you use the right size external condom. A condom that's too small can break easily. A condom that's too big can slip off during sex.  Use a new condom each time you have sex. Be careful not to poke a hole in the condom when you open the wrapper.  Don't use an internal condom and an external condom at the same time.  Never use petroleum jelly (such as Vaseline), grease, hand lotion, baby oil,  "or anything with oil in it. These products can make holes in the condom.  After intercourse, hold the edge of the condom as you remove it. This will help keep semen from spilling out of the condom.  Do not have sex with anyone who has symptoms of an STI, such as sores on the genitals or mouth.  Do not drink a lot of alcohol or use drugs before sex.  Limit your sex partners. Sex with one partner who has sex only with you can reduce your risk of getting an STI.  Don't share sex toys. But if you do share them, use a condom and clean the sex toys between each use.  Talk to your partner(s) before you have sex. Talk about what you feel comfortable with and whether you have any boundaries with sex. And find out if your partner(s) may be at risk for any STI. Keep in mind that a person may be able to spread an STI even if they do not have symptoms. You and your partner(s) may want to get tested for STIs.  Where can you learn more?  Go to https://www.[employer].net/patiented  Enter B608 in the search box to learn more about \"Safer Sex: Care Instructions.\"  Current as of: April 19, 2023               Content Version: 13.8    5152-8813 [employer].   Care instructions adapted under license by your healthcare professional. If you have questions about a medical condition or this instruction, always ask your healthcare professional. [employer] disclaims any warranty or liability for your use of this information.      "

## 2024-02-21 NOTE — NURSING NOTE
"Chief Complaint   Patient presents with    Physical     /70   Pulse 56   Temp 97.6  F (36.4  C) (Tympanic)   Resp 12   Ht 1.784 m (5' 10.25\")   Wt 80.3 kg (177 lb)   LMP 02/11/2024   SpO2 98%   BMI 25.22 kg/m   Estimated body mass index is 25.22 kg/m  as calculated from the following:    Height as of this encounter: 1.784 m (5' 10.25\").    Weight as of this encounter: 80.3 kg (177 lb).  Patient presents to the clinic using No DME      Health Maintenance that is potentially due pending provider review:    Health Maintenance Due   Topic Date Due    ANNUAL REVIEW OF HM ORDERS  Never done    ADVANCE CARE PLANNING  Never done    Pneumococcal Vaccine: Pediatrics (0 to 5 Years) and At-Risk Patients (6 to 64 Years) (1 of 2 - PCV) Never done    HIV SCREENING  Never done    HPV IMMUNIZATION (1 - 3-dose series) Never done    HEPATITIS C SCREENING  Never done    ASTHMA ACTION PLAN  01/22/2020    DTAP/TDAP/TD IMMUNIZATION (7 - Td or Tdap) 07/13/2021    YEARLY PREVENTIVE VISIT  12/18/2021    CHLAMYDIA SCREENING  05/21/2022    INFLUENZA VACCINE (1) 09/01/2023    COVID-19 Vaccine (1 - 2023-24 season) Never done    PAP  12/18/2023                  "

## 2024-02-21 NOTE — PROGRESS NOTES
"Preventive Care Visit  [facility]  Nickie Wylie MD, Family Medicine  Feb 21, 2024    Assessment & Plan     Routine general medical examination at a health care facility      Tinea corporis  Cream bid for 30 days   - terbinafine (LAMISIL) 1 % external cream; Apply topically 2 times daily For 30days    Mild persistent asthma without complication  Stable no change in treatment plan.   - albuterol (VENTOLIN HFA) 108 (90 Base) MCG/ACT inhaler; Inhale 2 puffs into the lungs every 4 hours as needed for wheezing FOR COUGH, 2 PUFFS 15 MINUTES BEFORE EXERCISE    Screening for HIV (human immunodeficiency virus)    - HIV Antigen Antibody Combo; Future    Need for hepatitis C screening test    - Hepatitis C Screen Reflex to HCV RNA Quant and Genotype; Future    Screening for STDs (sexually transmitted diseases)    - Chlamydia trachomatis PCR - Clinic Collect    Cervical cancer screening    - Pap Screen only - recommended age 21 - 24 years    Screening for hyperlipidemia    - Lipid panel reflex to direct LDL Fasting; Future    Screening for diabetes mellitus    - Glucose; Future    Patient has been advised of split billing requirements and indicates understanding: Yes          BMI  Estimated body mass index is 25.22 kg/m  as calculated from the following:    Height as of this encounter: 1.784 m (5' 10.25\").    Weight as of this encounter: 80.3 kg (177 lb).       Counseling  Appropriate preventive services were discussed with this patient, including applicable screening as appropriate for fall prevention, nutrition, physical activity, Tobacco-use cessation, weight loss and cognition.  Checklist reviewing preventive services available has been given to the patient.  Reviewed patient's diet, addressing concerns and/or questions.   She is at risk for lack of exercise and has been provided with information to increase physical activity for the benefit of her well-being.           Subjective   Tammi " is a 23 year old, presenting for the following:  Physical        2/21/2024     7:47 AM   Additional Questions   Roomed by Jennifer VILLASENOR   Accompanied by Self         2/21/2024     7:47 AM   Patient Reported Additional Medications   Patient reports taking the following new medications .        Health Care Directive  Patient does not have a Health Care Directive or Living Will: Discussed advance care planning with patient; however, patient declined at this time.    HPI      Asthma Follow-Up    Was ACT completed today?  Yes        2/21/2024     7:47 AM   ACT Total Scores   ACT TOTAL SCORE (Goal Greater than or Equal to 20) 21   In the past 12 months, how many times did you visit the emergency room for your asthma without being admitted to the hospital? 0   In the past 12 months, how many times were you hospitalized overnight because of your asthma? 0        How many days per week do you miss taking your asthma controller medication?  I do not have an asthma controller medication  Please describe any recent triggers for your asthma: upper respiratory infections  Have you had any Emergency Room Visits, Urgent Care Visits, or Hospital Admissions since your last office visit?  No  Rash  Onset/Duration: couple of months ago   Description  Location: torso  Character: round  Itching: mild  Intensity:  mild  Progression of Symptoms:  spreading  Accompanying signs and symptoms:   Fever: No  Body aches or joint pain: No  Sore throat symptoms: No  Recent cold symptoms: No  History:           Previous episodes of similar rash: None  New exposures:  None  Recent travel: No  Exposure to similar rash: No  Precipitating or alleviating factors:   Therapies tried and outcome: none      2/21/2024   General Health   How would you rate your overall physical health? Good   Feel stress (tense, anxious, or unable to sleep) To some extent   (!) STRESS CONCERN      2/21/2024   Nutrition   Three or more servings of calcium each day? Yes   Diet:  Regular (no restrictions)   How many servings of fruit and vegetables per day? (!) 2-3   How many sweetened beverages each day? 0-1         2/21/2024   Exercise   Days per week of moderate/strenous exercise 3 days         2/21/2024   Social Factors   Frequency of gathering with friends or relatives Twice a week   Worry food won't last until get money to buy more No   Food not last or not have enough money for food? No   Do you have housing?  Yes   Are you worried about losing your housing? No   Lack of transportation? No   Unable to get utilities (heat,electricity)? No         2/21/2024   Dental   Dentist two times every year? Yes         2/21/2024   TB Screening   Were you born outside of [place]?  No         Today's PHQ-2 Score:       2/21/2024     7:46 AM   PHQ-2 ( 1999 [employer])   Q1: Little interest or pleasure in doing things 0   Q2: Feeling down, depressed or hopeless 0   PHQ-2 Score 0   Q1: Little interest or pleasure in doing things Not at all   Q2: Feeling down, depressed or hopeless Not at all   PHQ-2 Score 0           2/21/2024   Substance Use   Alcohol more than 3/day or more than 7/wk No   Do you use any other substances recreationally? No     Social History     Tobacco Use    Smoking status: Never    Smokeless tobacco: Never   Substance Use Topics    Alcohol use: Yes    Drug use: No             2/21/2024   One time HIV Screening   Previous HIV test? No         2/21/2024   STI Screening   New sexual partner(s) since last STI/HIV test? (!) YES      History of abnormal Pap smear: NO - age 21-29 PAP every 3 years recommended        12/18/2020     8:46 AM   PAP / HPV   PAP (Historical) NIL            2/21/2024   Contraception/Family Planning   Questions about contraception or family planning No        Reviewed and updated as needed this visit by Provider   Tobacco  Allergies  Meds  Problems  Med Hx  Surg Hx  Fam Hx                  Review of Systems  Constitutional, HEENT, cardiovascular, pulmonary, gi and  "gu systems are negative, except as otherwise noted.     Objective    Exam  /70   Pulse 56   Temp 97.6  F (36.4  C) (Tympanic)   Resp 12   Ht 1.784 m (5' 10.25\")   Wt 80.3 kg (177 lb)   LMP 02/11/2024   SpO2 98%   BMI 25.22 kg/m     Estimated body mass index is 25.22 kg/m  as calculated from the following:    Height as of this encounter: 1.784 m (5' 10.25\").    Weight as of this encounter: 80.3 kg (177 lb).    Physical Exam  GENERAL: alert and no distress  EYES: Eyes grossly normal to inspection, PERRL and conjunctivae and sclerae normal  HENT: ear canals and TM's normal, nose and mouth without ulcers or lesions  NECK: no adenopathy, no asymmetry, masses, or scars  RESP: lungs clear to auscultation - no rales, rhonchi or wheezes  CV: regular rate and rhythm, normal S1 S2, no S3 or S4, no murmur, click or rub, no peripheral edema  ABDOMEN: soft, nontender, no hepatosplenomegaly, no masses and bowel sounds normal   (female) w/bimanual: normal female external genitalia, normal urethral meatus, normal vaginal mucosa, and normal cervix/adnexa/uterus without masses or discharge  MS: no gross musculoskeletal defects noted, no edema  SKIN: no suspicious lesions or rashes  NEURO: Normal strength and tone, mentation intact and speech normal  PSYCH: mentation appears normal, affect normal/bright      Signed Electronically by: Nickie Wylie MD    "

## 2024-02-26 LAB
BKR LAB AP GYN ADEQUACY: NORMAL
BKR LAB AP GYN INTERPRETATION: NORMAL
BKR LAB AP HPV REFLEX: NO
BKR LAB AP PREVIOUS ABNORMAL: NORMAL
PATH REPORT.COMMENTS IMP SPEC: NORMAL
PATH REPORT.COMMENTS IMP SPEC: NORMAL
PATH REPORT.RELEVANT HX SPEC: NORMAL

## 2024-11-27 ENCOUNTER — E-VISIT (OUTPATIENT)
Dept: FAMILY MEDICINE | Facility: CLINIC | Age: 24
End: 2024-11-27
Payer: COMMERCIAL

## 2024-11-27 DIAGNOSIS — J01.90 ACUTE SINUSITIS WITH SYMPTOMS > 10 DAYS: Primary | ICD-10-CM

## 2024-11-27 DIAGNOSIS — J45.30 MILD PERSISTENT ASTHMA WITHOUT COMPLICATION: ICD-10-CM

## 2024-11-27 RX ORDER — ALBUTEROL SULFATE 0.83 MG/ML
2.5 SOLUTION RESPIRATORY (INHALATION) EVERY 6 HOURS PRN
Qty: 90 ML | Refills: 3 | Status: SHIPPED | OUTPATIENT
Start: 2024-11-27

## 2025-01-22 ENCOUNTER — PATIENT OUTREACH (OUTPATIENT)
Dept: CARE COORDINATION | Facility: CLINIC | Age: 25
End: 2025-01-22
Payer: COMMERCIAL

## 2025-02-05 ENCOUNTER — PATIENT OUTREACH (OUTPATIENT)
Dept: CARE COORDINATION | Facility: CLINIC | Age: 25
End: 2025-02-05
Payer: COMMERCIAL

## 2025-02-15 NOTE — PATIENT INSTRUCTIONS
Albuterol is refilled     Try flonase for nasal symptoms, may wait until you are not exposed to cat to see if this resolves on its own    Cyst on the wrist see ortho    normal balance

## 2025-03-29 ENCOUNTER — HEALTH MAINTENANCE LETTER (OUTPATIENT)
Age: 25
End: 2025-03-29

## 2025-08-06 ENCOUNTER — TELEPHONE (OUTPATIENT)
Dept: FAMILY MEDICINE | Facility: CLINIC | Age: 25
End: 2025-08-06
Payer: COMMERCIAL